# Patient Record
Sex: MALE | Race: WHITE | Employment: STUDENT | URBAN - METROPOLITAN AREA
[De-identification: names, ages, dates, MRNs, and addresses within clinical notes are randomized per-mention and may not be internally consistent; named-entity substitution may affect disease eponyms.]

---

## 2018-07-19 ENCOUNTER — TELEPHONE (OUTPATIENT)
Dept: INTERNAL MEDICINE CLINIC | Age: 23
End: 2018-07-19

## 2018-08-28 ENCOUNTER — OFFICE VISIT (OUTPATIENT)
Dept: INTERNAL MEDICINE CLINIC | Age: 23
End: 2018-08-28

## 2018-08-28 VITALS
DIASTOLIC BLOOD PRESSURE: 60 MMHG | BODY MASS INDEX: 22.63 KG/M2 | HEART RATE: 60 BPM | SYSTOLIC BLOOD PRESSURE: 102 MMHG | HEIGHT: 75 IN | WEIGHT: 182 LBS

## 2018-08-28 DIAGNOSIS — Z11.3 SCREENING FOR STD (SEXUALLY TRANSMITTED DISEASE): ICD-10-CM

## 2018-08-28 DIAGNOSIS — Z00.00 ROUTINE GENERAL MEDICAL EXAMINATION AT A HEALTH CARE FACILITY: Primary | ICD-10-CM

## 2018-08-28 PROCEDURE — 99385 PREV VISIT NEW AGE 18-39: CPT | Performed by: INTERNAL MEDICINE

## 2018-08-28 ASSESSMENT — PATIENT HEALTH QUESTIONNAIRE - PHQ9
1. LITTLE INTEREST OR PLEASURE IN DOING THINGS: 0
2. FEELING DOWN, DEPRESSED OR HOPELESS: 0
SUM OF ALL RESPONSES TO PHQ QUESTIONS 1-9: 0
SUM OF ALL RESPONSES TO PHQ QUESTIONS 1-9: 0
SUM OF ALL RESPONSES TO PHQ9 QUESTIONS 1 & 2: 0

## 2018-08-28 NOTE — PROGRESS NOTES
History and Physical      Nora Brody  YOB: 1995    Date of Service:  8/28/2018    Vitals:    08/28/18 1005   BP: 102/60   Pulse: 60   Weight: 182 lb (82.6 kg)   Height: 6' 3\" (1.905 m)      Body mass index is 22.75 kg/m². Wt Readings from Last 3 Encounters:   08/28/18 182 lb (82.6 kg)   06/21/16 181 lb (82.1 kg)   08/19/14 182 lb (82.6 kg) (85 %, Z= 1.05)*     * Growth percentiles are based on [facility] 2-20 Years data. BP Readings from Last 3 Encounters:   08/28/18 102/60   06/21/16 114/70   08/19/14 118/76        Chief Complaint:   Janie Montano is a 25 y.o. male who presents for complete physical examination. HPI:  Here to re-establish care- no new concerns, but requesting STD check. Has had sexual encounters without condoms, but long-term relationships. Currently not sexually active. No penile discharges, no rashes, no ulcers, no fevers, no lymphadenopathy. Patient Active Problem List   Diagnosis    Left varicocele       No Known Allergies  Prior to Visit Medications    Medication Sig Taking? Authorizing Provider   desonide (DESOWEN) 0.05 % cream Apply topically 2 times daily Apply topically 2 times daily. Yes Historical Provider, MD        Past Medical History:   Diagnosis Date    History of jock itch     Shoulder dislocation 5/13    with labral tear     Past Surgical History:   Procedure Laterality Date    SHOULDER SURGERY Left 6/13    URETHRA SURGERY  Infancy    meatoplasty    WISDOM TOOTH EXTRACTION  2012     Family History   Problem Relation Age of Onset    Hypertension Maternal Grandmother     Prostate Cancer Paternal Grandfather     Alcohol Abuse Paternal Aunt     Alcohol Abuse Father     Arrhythmia Mother         SVT    Arrhythmia Maternal Uncle         WPW    COPD Paternal Grandmother     Osteoporosis Maternal Grandfather        Review of Systems:  A comprehensive review of systems was negative.       Physical Exam   Constitutional: He is oriented to person, place, and time. He appears well-developed and well-nourished. No distress. HENT:   Head: Normocephalic and atraumatic. Right Ear: Tympanic membrane, external ear and ear canal normal.   Left Ear: Tympanic membrane, external ear and ear canal normal.   Mouth/Throat: Oropharynx is clear and moist and mucous membranes are normal. No oropharyngeal exudate or posterior oropharyngeal erythema. Eyes: Pupils are equal, round, and reactive to light. Conjunctivae and lids are normal.   Neck: Full passive range of motion without pain. Neck supple. No JVD present. Carotid bruit is not present. No thyroid mass and no thyromegaly present. Cardiovascular: Normal rate, regular rhythm, normal heart sounds and intact distal pulses. Exam reveals no gallop and no friction rub. No murmur heard. Pulmonary/Chest: Effort normal and breath sounds normal. No respiratory distress. He has no wheezes. He has no rhonchi. He has no rales. Abdominal: Soft. Normal aorta and bowel sounds are normal. He exhibits no distension and no mass. There is no hepatosplenomegaly. There is no tenderness. Hernia confirmed negative in the right inguinal area and confirmed negative in the left inguinal area. Genitourinary: Testes normal and penis normal.   Musculoskeletal: Normal range of motion. He exhibits no edema. Lymphadenopathy:     He has no cervical adenopathy. Right: No inguinal adenopathy present. Left: No inguinal adenopathy present. Neurological: He is alert and oriented to person, place, and time. He has normal reflexes. Coordination normal.   Skin: Skin is warm, dry and intact. No rash noted. No erythema. No suspicious lesions. Psychiatric: He has a normal mood and affect.  His speech is normal and behavior is normal. Judgment and thought content normal. Cognition and memory are normal.     Preventive Care:  Eye exam within the past 2 years: not since childhood  Dental exam every 6 months: yes  Seatbelt used consistently: yes  Smoke and carbon monoxide detectors in home: yes   Advance Directive: N, Not Received  Diet: healthy, well-balanced diet  Exercise: dancing 2-4x/week, 1 hour  Social History   Substance Use Topics    Smoking status: Never Smoker    Smokeless tobacco: Never Used    Alcohol use No     History   Sexual Activity    Sexual activity: Yes    Partners: Female    Birth control/ protection: Condom       Immunization History   Administered Date(s) Administered    DTaP 02/29/1996, 04/25/1996, 06/13/1996, 06/24/1997, 12/12/2000    HPV Gardasil Quadrivalent 10/24/2013, 12/26/2013, 06/04/2014    Hepatitis A 04/03/2013, 10/24/2013    Hepatitis B, unspecified formulation 1995, 01/11/1996, 06/13/1996    Hib, unspecified formulation 02/29/1996, 04/25/1996, 06/13/1996    MMR 12/18/1996, 12/12/2000    Meningococcal MCV4P (Menactra) 07/25/2008    OPV 02/29/1996, 04/25/1996, 06/13/1996, 12/12/2000    Td 04/03/2013    Tdap (Boostrix, Adacel) 01/19/2007    Typhoid Inactivated 04/03/2013       Health Maintenance Due   Topic Date Due    HIV screen  12/11/2010          Assessment/Plan:  1. Routine general medical examination at a health care facility  Personalized Preventive Plan is provided to patient in written form- see Patient Instructions   - Patient has no Advanced Directive, so was encouraged to complete this document and forward a copy to be scanned into the electronic medical record:  additional information provided. - He was encouraged to schedule routine eye exam.  - Flu shot when available    2. Screening for STD (sexually transmitted disease)  Asymptomatic, but has had unprotected intercourse so requests screening  - C. Trachomatis / N. Gonorrhoeae, DNA  - TRICHOMONAS, MALE, URINE; Future  - Declines testing for HIV, syphilis, hepatitis  - Safer sex practices encouraged        Return in about 1 year (around 8/28/2019) for CPE.

## 2018-08-28 NOTE — PATIENT INSTRUCTIONS
Patient Education        Well Visit, Ages 25 to 48: Care Instructions  Your Care Instructions    Physical exams can help you stay healthy. Your doctor has checked your overall health and may have suggested ways to take good care of yourself. He or she also may have recommended tests. At home, you can help prevent illness with healthy eating, regular exercise, and other steps. Follow-up care is a key part of your treatment and safety. Be sure to make and go to all appointments, and call your doctor if you are having problems. It's also a good idea to know your test results and keep a list of the medicines you take. How can you care for yourself at home? · Reach and stay at a healthy weight. This will lower your risk for many problems, such as obesity, diabetes, heart disease, and high blood pressure. · Get at least 30 minutes of physical activity on most days of the week. Walking is a good choice. You also may want to do other activities, such as running, swimming, cycling, or playing tennis or team sports. Discuss any changes in your exercise program with your doctor. · Do not smoke or allow others to smoke around you. If you need help quitting, talk to your doctor about stop-smoking programs and medicines. These can increase your chances of quitting for good. · Talk to your doctor about whether you have any risk factors for sexually transmitted infections (STIs). Having one sex partner (who does not have STIs and does not have sex with anyone else) is a good way to avoid these infections. · Use birth control if you do not want to have children at this time. Talk with your doctor about the choices available and what might be best for you. · Protect your skin from too much sun. When you're outdoors from 10 a.m. to 4 p.m., stay in the shade or cover up with clothing and a hat with a wide brim. Wear sunglasses that block UV rays.  Even when it's cloudy, put broad-spectrum sunscreen (SPF 30 or higher) on any condoms. For men  · Tests for sexually transmitted infections (STIs). Ask whether you should have tests for STIs. You may be at risk if you have sex with more than one person, especially if you do not wear a condom. · Testicular cancer exam. Ask your doctor whether you should check your testicles regularly. · Prostate exam. Talk to your doctor about whether you should have a blood test (called a PSA test) for prostate cancer. Experts differ on whether and when men should have this test. Some experts suggest it if you are older than 39 and are -American or have a father or brother who got prostate cancer when he was younger than 72. When should you call for help? Watch closely for changes in your health, and be sure to contact your doctor if you have any problems or symptoms that concern you. Where can you learn more? Go to [email or web address] and sign in to your [facility] account. Enter P072 in the [employer] box to learn more about \"Well Visit, Ages 25 to 48: Care Instructions. \"     If you do not have an account, please click on the \"Sign Up Now\" link. Current as of: May 16, 2017  Content Version: 11.7  © 9578-9805 [employer]. Care instructions adapted under license by [facility] ([facility]). If you have questions about a medical condition or this instruction, always ask your healthcare professional. [address]  any warranty or liability for your use of this information.

## 2018-08-29 ENCOUNTER — TELEPHONE (OUTPATIENT)
Dept: INTERNAL MEDICINE CLINIC | Age: 23
End: 2018-08-29

## 2018-08-29 DIAGNOSIS — Z11.3 SCREENING FOR STD (SEXUALLY TRANSMITTED DISEASE): Primary | ICD-10-CM

## 2018-08-29 DIAGNOSIS — Z11.3 SCREENING FOR STD (SEXUALLY TRANSMITTED DISEASE): ICD-10-CM

## 2018-08-30 LAB
C. TRACHOMATIS DNA ,URINE: NEGATIVE
N. GONORRHOEAE DNA, URINE: NEGATIVE